# Patient Record
(demographics unavailable — no encounter records)

---

## 2024-10-20 NOTE — HEALTH RISK ASSESSMENT
[Good] : ~his/her~  mood as  good [Yes] : Yes [Monthly or less (1 pt)] : Monthly or less (1 point) [1 or 2 (0 pts)] : 1 or 2 (0 points) [Never (0 pts)] : Never (0 points) [No] : In the past 12 months have you used drugs other than those required for medical reasons? No [Little interest or pleasure doing things] : 1) Little interest or pleasure doing things [Feeling down, depressed, or hopeless] : 2) Feeling down, depressed, or hopeless [0] : 2) Feeling down, depressed, or hopeless: Not at all (0) [PHQ-2 Negative - No further assessment needed] : PHQ-2 Negative - No further assessment needed [Never] : Never [Patient reported mammogram was normal] : Patient reported mammogram was normal [Patient reported PAP Smear was normal] : Patient reported PAP Smear was normal [Patient reported bone density results were abnormal] : Patient reported bone density results were abnormal [Patient reported colonoscopy was normal] : Patient reported colonoscopy was normal [HIV test declined] : HIV test declined [Hepatitis C test declined] : Hepatitis C test declined [Employed] : employed [College] : College [] :  [# Of Children ___] : has [unfilled] children [Feels Safe at Home] : Feels safe at home [Fully functional (bathing, dressing, toileting, transferring, walking, feeding)] : Fully functional (bathing, dressing, toileting, transferring, walking, feeding) [Fully functional (using the telephone, shopping, preparing meals, housekeeping, doing laundry, using] : Fully functional and needs no help or supervision to perform IADLs (using the telephone, shopping, preparing meals, housekeeping, doing laundry, using transportation, managing medications and managing finances) [de-identified] : socially [de-identified] : uses elliptical 2/week  [de-identified] : generally healthy [[id number]] : 0 [Change in mental status noted] : No change in mental status noted [Reports changes in hearing] : Reports no changes in hearing [Reports changes in vision] : Reports no changes in vision [Reports changes in dental health] : Reports no changes in dental health [MammogramDate] : 2024 [PapSmearDate] : 2024 [BoneDensityDate] : 2024 [BoneDensityComments] : osteopenia [ColonoscopyDate] : 2018 [de-identified] : wear glasses [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

## 2024-10-20 NOTE — HEALTH RISK ASSESSMENT
[Good] : ~his/her~  mood as  good [Yes] : Yes [Monthly or less (1 pt)] : Monthly or less (1 point) [1 or 2 (0 pts)] : 1 or 2 (0 points) [Never (0 pts)] : Never (0 points) [No] : In the past 12 months have you used drugs other than those required for medical reasons? No [Little interest or pleasure doing things] : 1) Little interest or pleasure doing things [Feeling down, depressed, or hopeless] : 2) Feeling down, depressed, or hopeless [0] : 2) Feeling down, depressed, or hopeless: Not at all (0) [PHQ-2 Negative - No further assessment needed] : PHQ-2 Negative - No further assessment needed [Never] : Never [Patient reported mammogram was normal] : Patient reported mammogram was normal [Patient reported PAP Smear was normal] : Patient reported PAP Smear was normal [Patient reported bone density results were abnormal] : Patient reported bone density results were abnormal [Patient reported colonoscopy was normal] : Patient reported colonoscopy was normal [HIV test declined] : HIV test declined [Hepatitis C test declined] : Hepatitis C test declined [Employed] : employed [College] : College [] :  [# Of Children ___] : has [unfilled] children [Feels Safe at Home] : Feels safe at home [Fully functional (bathing, dressing, toileting, transferring, walking, feeding)] : Fully functional (bathing, dressing, toileting, transferring, walking, feeding) [Fully functional (using the telephone, shopping, preparing meals, housekeeping, doing laundry, using] : Fully functional and needs no help or supervision to perform IADLs (using the telephone, shopping, preparing meals, housekeeping, doing laundry, using transportation, managing medications and managing finances) [de-identified] : socially [de-identified] : uses elliptical 2/week  [de-identified] : generally healthy [[id number]] : 0 [Change in mental status noted] : No change in mental status noted [Reports changes in hearing] : Reports no changes in hearing [Reports changes in vision] : Reports no changes in vision [Reports changes in dental health] : Reports no changes in dental health [MammogramDate] : 2024 [PapSmearDate] : 2024 [BoneDensityDate] : 2024 [BoneDensityComments] : osteopenia [ColonoscopyDate] : 2018 [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

## 2024-10-20 NOTE — HISTORY OF PRESENT ILLNESS
[FreeTextEntry1] : for yearly physical.  [de-identified] :  Ms. EDMOND BANKS is a 57-year-old female presents today for wellness exam.  Want to discuss weight loss medication options. prefers injectables.  Denies hx of pancreatitis, no personal or family hx of medullary thyroid cancer.

## 2024-10-20 NOTE — ASSESSMENT
[FreeTextEntry1] : 1)wellness exam    Health Maintenance Counseling   Nutrition: Stressed importance of moderation in sodium/caffeine intake, saturated fat and cholesterol, caloric balance, sufficient intake of fresh fruits, vegetables, fiber, calcium, and iron, - Exercise: Stressed the importance of 30-40 minutes of regular exercise. - Substance Abuse: Discussed cessation/primary prevention of tobacco, alcohol, or other drug use; driving or other dangerous activities under the influence; availability of treatment for abuse. - Sexuality: Discussed sexually transmitted diseases, partner selection, use of condoms, avoidance of unintended pregnancy and contraceptive alternatives. - Injury prevention: Discussed safety belts, safety helmets, smoke detector, smoking near bedding or upholstery. - Dental health: Discussed importance of regular tooth brushing, flossing, and dental visits. - Discussed benefits of screening colonoscopy/mammogram - Immunizations reviewed.

## 2024-10-20 NOTE — HISTORY OF PRESENT ILLNESS
[FreeTextEntry1] : for yearly physical.  [de-identified] :  Ms. EDMOND BANKS is a 57-year-old female presents today for wellness exam.  Want to discuss weight loss medication options. prefers injectables.  Denies hx of pancreatitis, no personal or family hx of medullary thyroid cancer.

## 2025-04-22 NOTE — HISTORY OF PRESENT ILLNESS
[FreeTextEntry1] : follow up and rx refill.  [de-identified] : Ms. EDMOND BANKS is a 57 year-old female presents today for follow up  Tolerating Zepbound well. Reports weight plateaued for >1 month Osteoporosis - seeing Dr. Gonzales-Alendronate is on hold for 1 year

## 2025-04-22 NOTE — PHYSICAL EXAM
[No Acute Distress] : no acute distress [Well-Appearing] : well-appearing [No Respiratory Distress] : no respiratory distress  [No Accessory Muscle Use] : no accessory muscle use [Clear to Auscultation] : lungs were clear to auscultation bilaterally [Normal Rate] : normal rate  [Regular Rhythm] : with a regular rhythm [Normal S1, S2] : normal S1 and S2 [No Murmur] : no murmur heard [Alert and Oriented x3] : oriented to person, place, and time [No Edema] : there was no peripheral edema

## 2025-04-22 NOTE — COUNSELING
[Potential consequences of obesity discussed] : Potential consequences of obesity discussed [Benefits of weight loss discussed] : Benefits of weight loss discussed [Encouraged to maintain food diary] : Encouraged to maintain food diary [Encouraged to increase physical activity] : Encouraged to increase physical activity [Weigh Self Weekly] : weigh self weekly [Decrease Portions] : decrease portions [____ min/wk Activity] : [unfilled] min/wk activity [Keep Food Diary] : keep food diary [Good understanding] : Patient has a good understanding of disease, goals and obesity follow-up plan

## 2025-04-22 NOTE — HISTORY OF PRESENT ILLNESS
[FreeTextEntry1] : follow up and rx refill.  [de-identified] : Ms. EDMOND BANKS is a 57 year-old female presents today for follow up  Tolerating Zepbound well. Reports weight plateaued for >1 month Osteoporosis - seeing Dr. Gonzales-Alendronate is on hold for 1 year

## 2025-07-09 NOTE — HISTORY OF PRESENT ILLNESS
[FreeTextEntry1] : follow up obesity  [de-identified] : 56 yo hx osteoporosis, obesity presenting for follow up. PA denied zepbound due to pt BMI but now needs updated prescription. lost weight on the 5 mg. she feels well. would like to pay out of pocket for the vial/syringe if denied again.